# Patient Record
Sex: FEMALE | Race: BLACK OR AFRICAN AMERICAN
[De-identification: names, ages, dates, MRNs, and addresses within clinical notes are randomized per-mention and may not be internally consistent; named-entity substitution may affect disease eponyms.]

---

## 2017-11-29 ENCOUNTER — HOSPITAL ENCOUNTER (EMERGENCY)
Dept: HOSPITAL 8 - ED | Age: 22
Discharge: HOME | End: 2017-11-29
Payer: MEDICAID

## 2017-11-29 VITALS — SYSTOLIC BLOOD PRESSURE: 139 MMHG | DIASTOLIC BLOOD PRESSURE: 74 MMHG

## 2017-11-29 VITALS — BODY MASS INDEX: 36.4 KG/M2 | WEIGHT: 231.93 LBS | HEIGHT: 67 IN

## 2017-11-29 DIAGNOSIS — N30.01: ICD-10-CM

## 2017-11-29 DIAGNOSIS — Z76.0: Primary | ICD-10-CM

## 2017-11-29 DIAGNOSIS — L20.9: ICD-10-CM

## 2017-11-29 LAB
HCG UR LOT: (no result)
HCG UR OBC: (no result)

## 2017-11-29 PROCEDURE — 87186 SC STD MICRODIL/AGAR DIL: CPT

## 2017-11-29 PROCEDURE — 81001 URINALYSIS AUTO W/SCOPE: CPT

## 2017-11-29 PROCEDURE — 87086 URINE CULTURE/COLONY COUNT: CPT

## 2017-11-29 PROCEDURE — 99284 EMERGENCY DEPT VISIT MOD MDM: CPT

## 2017-11-29 PROCEDURE — 87077 CULTURE AEROBIC IDENTIFY: CPT

## 2017-11-29 PROCEDURE — 81025 URINE PREGNANCY TEST: CPT

## 2017-12-29 ENCOUNTER — APPOINTMENT (OUTPATIENT)
Dept: RADIOLOGY | Facility: MEDICAL CENTER | Age: 22
End: 2017-12-29
Attending: EMERGENCY MEDICINE
Payer: COMMERCIAL

## 2017-12-29 ENCOUNTER — HOSPITAL ENCOUNTER (EMERGENCY)
Facility: MEDICAL CENTER | Age: 22
End: 2017-12-29
Attending: EMERGENCY MEDICINE
Payer: COMMERCIAL

## 2017-12-29 VITALS
OXYGEN SATURATION: 100 % | HEART RATE: 75 BPM | BODY MASS INDEX: 37.2 KG/M2 | RESPIRATION RATE: 15 BRPM | DIASTOLIC BLOOD PRESSURE: 83 MMHG | HEIGHT: 67 IN | SYSTOLIC BLOOD PRESSURE: 142 MMHG | TEMPERATURE: 98.6 F | WEIGHT: 237 LBS

## 2017-12-29 DIAGNOSIS — S09.90XA CLOSED HEAD INJURY, INITIAL ENCOUNTER: ICD-10-CM

## 2017-12-29 DIAGNOSIS — S92.901A CLOSED FRACTURE OF RIGHT FOOT, INITIAL ENCOUNTER: ICD-10-CM

## 2017-12-29 DIAGNOSIS — S00.83XA CONTUSION OF FOREHEAD, INITIAL ENCOUNTER: ICD-10-CM

## 2017-12-29 DIAGNOSIS — S90.31XA CONTUSION OF RIGHT FOOT, INITIAL ENCOUNTER: ICD-10-CM

## 2017-12-29 DIAGNOSIS — T14.8XXA ABRASION: ICD-10-CM

## 2017-12-29 PROCEDURE — 305948 HCHG GREEN TRAUMA ACT PRE-NOTIFY NO CC

## 2017-12-29 PROCEDURE — 73630 X-RAY EXAM OF FOOT: CPT | Mod: RT

## 2017-12-29 PROCEDURE — 70450 CT HEAD/BRAIN W/O DYE: CPT

## 2017-12-29 PROCEDURE — 99284 EMERGENCY DEPT VISIT MOD MDM: CPT

## 2017-12-29 RX ORDER — KETOROLAC TROMETHAMINE 10 MG/1
10 TABLET, FILM COATED ORAL EVERY 6 HOURS PRN
Qty: 22 TAB | Refills: 0 | Status: SHIPPED | OUTPATIENT
Start: 2017-12-29 | End: 2019-06-12

## 2017-12-29 ASSESSMENT — PAIN SCALES - WONG BAKER: WONGBAKER_NUMERICALRESPONSE: HURTS A LITTLE MORE
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
## 2017-12-29 NOTE — DISCHARGE INSTRUCTIONS
Contusion  A contusion is a deep bruise. Contusions are the result of an injury that caused bleeding under the skin. The contusion may turn blue, purple, or yellow. Minor injuries will give you a painless contusion, but more severe contusions may stay painful and swollen for a few weeks.   CAUSES   A contusion is usually caused by a blow, trauma, or direct force to an area of the body.  SYMPTOMS   · Swelling and redness of the injured area.  · Bruising of the injured area.  · Tenderness and soreness of the injured area.  · Pain.  DIAGNOSIS   The diagnosis can be made by taking a history and physical exam. An X-ray, CT scan, or MRI may be needed to determine if there were any associated injuries, such as fractures.  TREATMENT   Specific treatment will depend on what area of the body was injured. In general, the best treatment for a contusion is resting, icing, elevating, and applying cold compresses to the injured area. Over-the-counter medicines may also be recommended for pain control. Ask your caregiver what the best treatment is for your contusion.  HOME CARE INSTRUCTIONS   · Put ice on the injured area.  ¨ Put ice in a plastic bag.  ¨ Place a towel between your skin and the bag.  ¨ Leave the ice on for 15-20 minutes, 3-4 times a day, or as directed by your health care provider.  · Only take over-the-counter or prescription medicines for pain, discomfort, or fever as directed by your caregiver. Your caregiver may recommend avoiding anti-inflammatory medicines (aspirin, ibuprofen, and naproxen) for 48 hours because these medicines may increase bruising.  · Rest the injured area.  · If possible, elevate the injured area to reduce swelling.  SEEK IMMEDIATE MEDICAL CARE IF:   · You have increased bruising or swelling.  · You have pain that is getting worse.  · Your swelling or pain is not relieved with medicines.  MAKE SURE YOU:   · Understand these instructions.  · Will watch your condition.  · Will get help right  away if you are not doing well or get worse.     This information is not intended to replace advice given to you by your health care provider. Make sure you discuss any questions you have with your health care provider.     Document Released: 09/27/2006 Document Revised: 12/23/2014 Document Reviewed: 10/22/2012  [employer] Interactive Patient Education ©2016 [employer]      Head Injury, Adult  You have a head injury. Headaches and throwing up (vomiting) are common after a head injury. It should be easy to wake up from sleeping. Sometimes you must stay in the hospital. Most problems happen within the first 24 hours. Side effects may occur up to 7-10 days after the injury.   WHAT ARE THE TYPES OF HEAD INJURIES?  Head injuries can be as minor as a bump. Some head injuries can be more severe. More severe head injuries include:  · A jarring injury to the brain (concussion).  · A bruise of the brain (contusion). This mean there is bleeding in the brain that can cause swelling.  · A cracked skull (skull fracture).  · Bleeding in the brain that collects, clots, and forms a bump (hematoma).  WHEN SHOULD I GET HELP RIGHT AWAY?   · You are confused or sleepy.  · You cannot be woken up.  · You feel sick to your stomach (nauseous) or keep throwing up (vomiting).  · Your dizziness or unsteadiness is getting worse.  · You have very bad, lasting headaches that are not helped by medicine. Take medicines only as told by your doctor.  · You cannot use your arms or legs like normal.  · You cannot walk.  · You notice changes in the black spots in the center of the colored part of your eye (pupil).  · You have clear or bloody fluid coming from your nose or ears.  · You have trouble seeing.  During the next 24 hours after the injury, you must stay with someone who can watch you. This person should get help right away (call 911 in the [place] if you start to shake and are not able to control it (have seizures), you pass out, or you are  unable to wake up.  HOW CAN I PREVENT A HEAD INJURY IN THE FUTURE?  · Wear seat belts.  · Wear a helmet while bike riding and playing sports like football.  · Stay away from dangerous activities around the house.  WHEN CAN I RETURN TO NORMAL ACTIVITIES AND ATHLETICS?  See your doctor before doing these activities. You should not do normal activities or play contact sports until 1 week after the following symptoms have stopped:  · Headache that does not go away.  · Dizziness.  · Poor attention.  · Confusion.  · Memory problems.  · Sickness to your stomach or throwing up.  · Tiredness.  · Fussiness.  · Bothered by bright lights or loud noises.  · Anxiousness or depression.  · Restless sleep.  MAKE SURE YOU:   · Understand these instructions.  · Will watch your condition.  · Will get help right away if you are not doing well or get worse.     This information is not intended to replace advice given to you by your health care provider. Make sure you discuss any questions you have with your health care provider.     Document Released: 11/30/2009 Document Revised: 01/08/2016 Document Reviewed: 08/25/2014  [employer] Interactive Patient Education ©2016 [employer]  Return if fever, vomiting or if no better in 12 hours.
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
## 2018-03-23 ENCOUNTER — APPOINTMENT (OUTPATIENT)
Dept: RADIOLOGY | Facility: MEDICAL CENTER | Age: 23
End: 2018-03-23
Attending: EMERGENCY MEDICINE
Payer: COMMERCIAL

## 2018-03-23 ENCOUNTER — HOSPITAL ENCOUNTER (EMERGENCY)
Facility: MEDICAL CENTER | Age: 23
End: 2018-03-23
Attending: EMERGENCY MEDICINE
Payer: COMMERCIAL

## 2018-03-23 VITALS
WEIGHT: 199.74 LBS | HEIGHT: 67 IN | HEART RATE: 97 BPM | TEMPERATURE: 97.8 F | BODY MASS INDEX: 31.35 KG/M2 | OXYGEN SATURATION: 97 % | DIASTOLIC BLOOD PRESSURE: 87 MMHG | RESPIRATION RATE: 16 BRPM | SYSTOLIC BLOOD PRESSURE: 117 MMHG

## 2018-03-23 DIAGNOSIS — M43.6 TORTICOLLIS: ICD-10-CM

## 2018-03-23 DIAGNOSIS — Z20.2 STD EXPOSURE: ICD-10-CM

## 2018-03-23 DIAGNOSIS — M79.671 RIGHT FOOT PAIN: ICD-10-CM

## 2018-03-23 LAB
APPEARANCE UR: CLEAR
BACTERIA GENITAL QL WET PREP: NORMAL
C TRACH DNA SPEC QL NAA+PROBE: POSITIVE
COLOR UR AUTO: YELLOW
GLUCOSE UR QL STRIP.AUTO: NEGATIVE MG/DL
HCG UR QL: NEGATIVE
KETONES UR QL STRIP.AUTO: ABNORMAL MG/DL
LEUKOCYTE ESTERASE UR QL STRIP.AUTO: NEGATIVE
N GONORRHOEA DNA SPEC QL NAA+PROBE: NEGATIVE
NITRITE UR QL STRIP.AUTO: NEGATIVE
PH UR STRIP.AUTO: 6 [PH]
PROT UR QL STRIP: NEGATIVE MG/DL
RBC UR QL AUTO: NEGATIVE
SIGNIFICANT IND 70042: NORMAL
SITE SITE: NORMAL
SOURCE SOURCE: NORMAL
SP GR UR: >=1.03
SPECIMEN SOURCE: ABNORMAL

## 2018-03-23 PROCEDURE — 99284 EMERGENCY DEPT VISIT MOD MDM: CPT

## 2018-03-23 PROCEDURE — 96372 THER/PROPH/DIAG INJ SC/IM: CPT

## 2018-03-23 PROCEDURE — 73630 X-RAY EXAM OF FOOT: CPT | Mod: RT

## 2018-03-23 PROCEDURE — A9270 NON-COVERED ITEM OR SERVICE: HCPCS | Performed by: EMERGENCY MEDICINE

## 2018-03-23 PROCEDURE — 81025 URINE PREGNANCY TEST: CPT

## 2018-03-23 PROCEDURE — 81002 URINALYSIS NONAUTO W/O SCOPE: CPT

## 2018-03-23 PROCEDURE — 700111 HCHG RX REV CODE 636 W/ 250 OVERRIDE (IP): Performed by: EMERGENCY MEDICINE

## 2018-03-23 PROCEDURE — 700102 HCHG RX REV CODE 250 W/ 637 OVERRIDE(OP): Performed by: EMERGENCY MEDICINE

## 2018-03-23 PROCEDURE — 87591 N.GONORRHOEAE DNA AMP PROB: CPT

## 2018-03-23 PROCEDURE — 87491 CHLMYD TRACH DNA AMP PROBE: CPT

## 2018-03-23 RX ORDER — CEFTRIAXONE SODIUM 250 MG/1
250 INJECTION, POWDER, FOR SOLUTION INTRAMUSCULAR; INTRAVENOUS ONCE
Status: COMPLETED | OUTPATIENT
Start: 2018-03-23 | End: 2018-03-23

## 2018-03-23 RX ORDER — AZITHROMYCIN 250 MG/1
1000 TABLET, FILM COATED ORAL ONCE
Status: COMPLETED | OUTPATIENT
Start: 2018-03-23 | End: 2018-03-23

## 2018-03-23 RX ADMIN — AZITHROMYCIN 1000 MG: 250 TABLET, FILM COATED ORAL at 09:38

## 2018-03-23 RX ADMIN — CEFTRIAXONE SODIUM 250 MG: 250 INJECTION, POWDER, FOR SOLUTION INTRAMUSCULAR; INTRAVENOUS at 09:38

## 2018-03-23 ASSESSMENT — ENCOUNTER SYMPTOMS
NECK PAIN: 1
HEADACHES: 0
BACK PAIN: 1

## 2018-03-23 ASSESSMENT — PAIN SCALES - GENERAL: PAINLEVEL_OUTOF10: 8

## 2018-03-23 NOTE — DISCHARGE INSTRUCTIONS
Please follow-up with your primary care provider for blood pressure management.   Acute Torticollis  Torticollis is a condition in which the muscles of the neck tighten (contract) abnormally, causing the neck to twist and the head to move into an unnatural position. Torticollis that develops suddenly is called acute torticollis. If torticollis becomes chronic and is left untreated, the face and neck can become deformed.  CAUSES  This condition may be caused by:  · Sleeping in an awkward position (common).  · Extending or twisting the neck muscles beyond their normal position.  · Infection.  In some cases, the cause may not be known.  SYMPTOMS  Symptoms of this condition include:  · An unnatural position of the head.  · Neck pain.  · A limited ability to move the neck.  · Twisting of the neck to one side.  DIAGNOSIS  This condition is diagnosed with a physical exam. You may also have imaging tests, such as an X-ray, CT scan, or MRI.  TREATMENT  Treatment for this condition involves trying to relax the neck muscles. It may include:  · Medicines or shots.  · Physical therapy.  · Surgery. This may be done in severe cases.  HOME CARE INSTRUCTIONS  · Take medicines only as directed by your health care provider.  · Do stretching exercises and massage your neck as directed by your health care provider.  · Keep all follow-up visits as directed by your health care provider. This is important.  SEEK MEDICAL CARE IF:  · You develop a fever.  SEEK IMMEDIATE MEDICAL CARE IF:  · You develop difficulty breathing.  · You develop noisy breathing (stridor).  · You start drooling.  · You have trouble swallowing or have pain with swallowing.  · You develop numbness or weakness in your hands or feet.  · You have changes in your speech, understanding, or vision.  · Your pain gets worse.  This information is not intended to replace advice given to you by your health care provider. Make sure you discuss any questions you have with your  health care provider.  Document Released: 12/15/2001 Document Revised: 04/10/2017 Document Reviewed: 12/14/2015  [employer] Interactive Patient Education © 2017 [employer]  Foot Fracture  Your caregiver has diagnosed you as having a healing foot fracture (broken bone). Your foot has many bones. You have an old fracture, or break, in one of these bones. In some cases, your doctor may put on a splint or removable fracture boot until the swelling in your foot has lessened. A cast may or may not be required.  HOME CARE INSTRUCTIONS   If you do not have a cast or splint:  · You may bear weight on your injured foot as tolerated or advised.   · Do not put any weight on your injured foot for as long as directed by your caregiver. Slowly increase the amount of time you walk on the foot as the pain and swelling allows or as advised.   · Use crutches until you can bear weight without pain. A gradual increase in weight bearing may help.   · Apply ice to the injury for 15-20 minutes each hour while awake for the first 2 days. Put the ice in a plastic bag and place a towel between the bag of ice and your skin.   · If an ace bandage (stretchy, elastic wrapping bandage) was applied, you may re-wrap it if ankle is more painful or your toes become cold and swollen.   If you have a cast or splint:  · Use your crutches for as long as directed by your caregiver.   · To lessen the swelling, keep the injured foot elevated on pillows while lying down or sitting. Elevate your foot above your heart.   · Apply ice to the injury for 15-20 minutes each hour while awake for the first 2 days. Put the ice in a plastic bag and place a thin towel between the bag of ice and your cast.   · Plaster or fiberglass cast:   · Do not try to scratch the skin under the cast using a sharp or pointed object down the cast.   · Check the skin around the cast every day. You may put lotion on any red or sore areas.   · Keep your cast clean and dry.   · Plaster  splint:   · Wear the splint until you are seen for a follow-up examination.   · You may loosen the elastic around the splint if your toes become numb, tingle, or turn blue or cold. Do not rest it on anything harder than a pillow in the first 24 hours.   · Do not put pressure on any part of your splint. Use your crutches as directed.   · Keep your splint dry. It can be protected during bathing with a plastic bag. Do not lower the splint into water.   · If you have a fracture boot you may remove it to shower. Bear weight only as instructed by your caregiver.   · Only take over-the-counter or prescription medicines for pain, discomfort, or fever as directed by your caregiver.   SEEK IMMEDIATE MEDICAL CARE IF:   · Your cast gets damaged or breaks.   · You have continued severe pain or more swelling than you did before the cast was put on.   · Your skin or nails of your casted foot turn blue, gray, feel cold or numb.   · There is a bad smell from your cast.   · There is severe pain with movement of your toes.   · There are new stains and/or drainage coming from under the cast.   MAKE SURE YOU:   · Understand these instructions.   · Will watch your condition.   · Will get help right away if you are not doing well or get worse.   Document Released: 12/15/2001 Document Revised: 03/11/2013 Document Reviewed: 01/21/2010  [employer]® Patient Information ©2013 [employer] [place].
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
## 2018-03-23 NOTE — ED PROVIDER NOTES
"ED Provider Note    Scribed for Prasad Espinosa M.D. by Erickson Guevara. 3/23/2018, 8:46 AM.    Primary care provider: Pcp Pt States None  Means of arrival: Walk-in  History obtained from: Patient  History limited by: None    CHIEF COMPLAINT  Chief Complaint   Patient presents with   • Foot Pain   • Neck Pain   • Vaginal Discharge       HPI  Angelo Marinelli is a 22 y.o. female who presents to the Emergency Department for multiple complaints of right foot pain, right-sided neck pain, and vaginal evaluation. The patient reports of initially developing right foot pain in December 2017 and was seen in this ED for symptoms. She was given a walking boot after being diagnosed with closed fracture and was advised to follow up with Ortho. However, she reported significant discomfort with the walking boot and took it off without being seen by Ortho. The patient reports of worsening right foot pain several days ago with discomfort in all different toes at different times, but is still able to ambulate without complications.     The patient complaints of right-sided neck pain after sleeping on it wrong and woke up with the development of a \"knot to the area of the neck\". She has been massaging the area and states the knot has minimally decreased. She states the neck pain has significantly worsened today with the development of sharp pains to the upper mid back. She denies any headaches.     The patient is requesting to have a vaginal evaluation and to be tested for STDs after her significant other began developing urinary discomfort yesterday. She denies any vaginal pain or discomfort, fever, or chills. Both her and her partner deny any new sexual encounters. Her partner is also seen in the ED to be evaluated.     REVIEW OF SYSTEMS  Review of Systems   Genitourinary: Negative.    Musculoskeletal: Positive for back pain, joint pain (Right foot) and neck pain (Right).   Neurological: Negative for headaches.   E.     PAST " "MEDICAL HISTORY  The patient has no chronic medical history.    SURGICAL HISTORY  patient denies any surgical history    SOCIAL HISTORY  Social History   Substance Use Topics   • Smoking status: Never Smoker   • Alcohol use No      History   Drug Use   • Types: Inhaled     Comment: Marijuana     FAMILY HISTORY  No family history noted    CURRENT MEDICATIONS  No current facility-administered medications for this encounter.     Current Outpatient Prescriptions:   •  ketorolac (TORADOL) 10 MG Tab, Take 1 Tab by mouth every 6 hours as needed for Mild Pain for up to 22 doses., Disp: 22 Tab, Rfl: 0    ALLERGIES  Allergies   Allergen Reactions   • Pcn [Penicillins]        PHYSICAL EXAM  VITAL SIGNS: /65   Pulse (!) 103   Temp 36.3 °C (97.4 °F) (Temporal)   Resp 14   Ht 1.702 m (5' 7\")   Wt 90.6 kg (199 lb 11.8 oz)   SpO2 99%   BMI 31.28 kg/m²     Constitutional: Well developed, Well nourished, No acute distress, Non-toxic appearance.   HENT: Normocephalic, Atraumatic, Bilateral external ears normal, oropharynx moist, No oral exudates, Nose normal.   Eyes:conjunctiva is normal, there are no signs of exudate.   Neck: Supple, no meningeal signs. Tenderness to the right paraspinous musculature.   Lymphatic: One single lymph node that is slightly enlarged at 1cm, tender to palpation without any erythema. No other lymphadenopathy noted.   Cardiovascular: Tachycardic, Regular rhythm without murmurs gallops or rubs.   Thorax & Lungs: No respiratory distress. Breathing comfortably. Lungs are clear to auscultation bilaterally, there are no wheezes no rales. Chest wall is nontender.  Abdomen: Soft, nontender, nondistended. Bowel sounds are present.   Skin: Warm, Dry, No erythema.  Pelvic Exam: Accompanied by female nurse, normal external female genitalia, no discharge noted.   Back: No tenderness, No CVA tenderness.  Musculoskeletal: Tenderness to the lateral aspect of the right foot without any edema or swelling. Good " range of motion in all other major joints. Intact distal pulses, no clubbing, no cyanosis. Patient is able to family without assistance  Neurologic: Alert & oriented x 3, Moving all extremities. Ambulatory without assistance, neurologically intact.   Psychiatric: Affect normal, Judgment normal, Mood normal.     LABS  Results for orders placed or performed during the hospital encounter of 03/23/18   WET PREP   Result Value Ref Range    Significant Indicator NEG     Source GEN     Site VAGINAL     Wet Prep For Parasites       Many WBC's seen.  No motile Trichomonas seen.  No clue cells seen.  No yeast.     CHLAMYDIA & GC BY PCR   Result Value Ref Range    Source Genital    POC UA   Result Value Ref Range    POC Color Yellow     POC Appearance Clear     POC Glucose Negative Negative mg/dL    POC Ketones Trace (A) Negative mg/dL    POC Specific Gravity >=1.030 (A) 1.005 - 1.030    POC Blood Negative Negative    POC Urine PH 6.0 5.0 - 8.0    POC Protein Negative Negative mg/dL    POC Nitrites Negative Negative    POC Leukocyte Esterase Negative Negative   POC URINE PREGNANCY   Result Value Ref Range    POC Urine Pregnancy Test Negative Negative   All labs reviewed by me.    RADIOLOGY  DX-FOOT-COMPLETE 3+ RIGHT   Final Result      Healing fracture distal right fifth metatarsal. Residual fracture line identified.   No new fracture.   Distal fourth metatarsal fracture callus formation since previous examination consistent with stress fracture.      The radiologist's interpretation of all radiological studies have been reviewed by me.    COURSE & MEDICAL DECISION MAKING  Pertinent Labs & Imaging studies reviewed. (See chart for details)    8:55 AM - Patient seen and examined at bedside. Informed the patient that her neck pain is likely due to torticollis, in which I had recommended movement and stretches of the neck for relief and using warmth for muscle tension relief. An xray will be ordered to evaluate her right foot, but  have recommended she follow up with Ortho for orthotics. Additionally, a pelvic exam will be performed to rule out chlamydia/GC, and she will be treated with Rocephin 250mg and Zithromax 1g for possible exposure to STD's. Wet prep, POC urinalysis, and POC urine pregnancy ordered. Patient understands and is agreeable to plan of care.     9:09 AM Pelvic Exam was performed at this time with assisted female nurse, see above for more details.     9:33 AM Patient was reevaluated at bedside. She is standing up in her room with her significant other. Discussed radiology results with the patient and informed them that there is no new fracture on xray. I have recommended she follow up with Ortho. The patient will be discharged and should return if symptoms worsen or if new symptoms arise. The patient understands and agrees to plan.       Decision Making:  Patient presents with multiple complaints. The neck is torticollis. Recommend range of motion exercises and anti-inflammatories. From standpoint of the foot. She does have healing fractures. I recommended following up with an orthopedist for orthotics placement. At this point. It's been 3 months. Recommend anti-inflammatory as needed. Follow-up as above. From the standpoint of potential STD was treated empirically. There is no other significant finding and clinical evaluation. At this point. Recommend follow-up with a primary care physician for further outpatient treatment and care.    The patient will return for new or worsening symptoms and is stable at the time of discharge.    It was noticed that the patient's blood pressure was greater than 120/80 on today's visit. At this point, most likely related to reactive hypertension, secondary to the emergency visit itself. I have recommend the patient followup with her primary care physician for recheck of her blood pressure.        DISPOSITION:  Patient will be discharged home in stable condition.    FOLLOW UP:  [facility]  WIL[facility] Abrams  [address]  [address]  [place] [address]  [phone]    Schedule an appointment as soon as possible for a visit        FINAL IMPRESSION  1. Torticollis    2. Right foot pain    3. STD exposure          IErickson (Scribe), am scribing for, and in the presence of, Prasad Espinosa M.D..    Electronically signed by: Erickson Guevara ([email or web address]), 3/23/2018    IPrasad M.D. personally performed the services described in this documentation, as scribed by Erickson Guevara in my presence, and it is both accurate and complete.    The note accurately reflects work and decisions made by me.  Prasad Espinosa  3/23/2018  12:05 PM

## 2018-03-23 NOTE — ED NOTES
Pt given d/c instructions, f/u info,  VSS at discharge. Pt ambulatory from the ED w/ steady gait.  All belongings in possession on discharge.  Pt escorted to the lobby.

## 2018-03-23 NOTE — ED NOTES
Pt ambulatory to [facility].  Pt previously became upset in the hallway because she was not given the same room as significant other.  Pt's roomed separately but [id number] due to need for pelvic gurney and room for evaluations.  Pt provided a gown to change into.

## 2018-03-26 NOTE — ED NOTES
"ED Positive Culture Follow-up/Notification Note:    Date: 3/26/18     Patient seen in the ED on 3/23/2018 for:   1. Torticollis    2. Right foot pain    3. STD exposure       She was treated with ceftriaxone 250 mg IM x 1 and azithromycin 1 gm PO x 1 in the ED.     Discharge Medication List as of 3/23/2018  9:44 AM          Allergies: Pcn [penicillins]     Vitals:    03/23/18 0825 03/23/18 0829 03/23/18 0950   BP: 123/65  117/87   Pulse: (!) 103  97   Resp: 14  16   Temp: 36.3 °C (97.4 °F)  36.6 °C (97.8 °F)   TempSrc: Temporal     SpO2: 99%  97%   Weight:  90.6 kg (199 lb 11.8 oz)    Height: 1.702 m (5' 7\")         Final cultures:   Results     Procedure Component Value Units Date/Time    CHLAMYDIA & GC BY PCR [[id number]]  (Abnormal) Collected:  03/23/18 0910    Order Status:  Completed Specimen:  Genital from Genital Updated:  03/23/18 1904     Source Genital     C. trachomatis by PCR POSITIVE (A)     N. gonorrhoeae by PCR Negative    WET PREP [[id number]] Collected:  03/23/18 0910    Order Status:  Completed Specimen:  Genital from Vaginal Updated:  03/23/18 0928     Significant Indicator NEG     Source GEN     Site VAGINAL     Wet Prep For Parasites Many WBC's seen.  No motile Trichomonas seen.  No clue cells seen.  No yeast.            Plan:   - Patient was treated for chlamydia while in the ED.  - Informed and counseled patient on the positive chlamydia result.    Jenny Thomson    "

## 2018-07-18 ENCOUNTER — HOSPITAL ENCOUNTER (EMERGENCY)
Dept: HOSPITAL 8 - ED | Age: 23
End: 2018-07-18
Payer: MEDICAID

## 2018-07-18 VITALS — BODY MASS INDEX: 30.1 KG/M2 | WEIGHT: 191.8 LBS | HEIGHT: 67 IN

## 2018-07-18 VITALS — DIASTOLIC BLOOD PRESSURE: 70 MMHG | SYSTOLIC BLOOD PRESSURE: 120 MMHG

## 2018-07-18 DIAGNOSIS — R19.7: ICD-10-CM

## 2018-07-18 DIAGNOSIS — N89.8: Primary | ICD-10-CM

## 2018-07-18 DIAGNOSIS — F90.9: ICD-10-CM

## 2018-07-18 DIAGNOSIS — F31.9: ICD-10-CM

## 2018-07-18 DIAGNOSIS — R10.2: ICD-10-CM

## 2018-07-18 DIAGNOSIS — R05: ICD-10-CM

## 2018-07-18 DIAGNOSIS — F12.10: ICD-10-CM

## 2018-07-18 LAB
CLUE CELLS: (no result)
CULTURE INDICATED?: YES
HCG UR SG: 1.02 (ref 1–1.03)
MICROSCOPIC: (no result)
T VAGINALIS RRNA GENITAL QL PROBE: (no result)
WET PREP WBCS: (no result)

## 2018-07-18 PROCEDURE — 99284 EMERGENCY DEPT VISIT MOD MDM: CPT

## 2018-07-18 PROCEDURE — 81001 URINALYSIS AUTO W/SCOPE: CPT

## 2018-07-18 PROCEDURE — 96372 THER/PROPH/DIAG INJ SC/IM: CPT

## 2018-07-18 PROCEDURE — 87086 URINE CULTURE/COLONY COUNT: CPT

## 2018-07-18 PROCEDURE — 87808 TRICHOMONAS ASSAY W/OPTIC: CPT

## 2018-07-18 PROCEDURE — 81025 URINE PREGNANCY TEST: CPT

## 2018-07-18 PROCEDURE — 87491 CHLMYD TRACH DNA AMP PROBE: CPT

## 2018-07-18 PROCEDURE — 87591 N.GONORRHOEAE DNA AMP PROB: CPT

## 2018-07-18 PROCEDURE — 87210 SMEAR WET MOUNT SALINE/INK: CPT

## 2019-06-12 ENCOUNTER — HOSPITAL ENCOUNTER (EMERGENCY)
Facility: MEDICAL CENTER | Age: 24
End: 2019-06-12
Attending: EMERGENCY MEDICINE
Payer: MEDICAID

## 2019-06-12 ENCOUNTER — APPOINTMENT (OUTPATIENT)
Dept: RADIOLOGY | Facility: MEDICAL CENTER | Age: 24
End: 2019-06-12
Attending: EMERGENCY MEDICINE
Payer: MEDICAID

## 2019-06-12 VITALS
WEIGHT: 197.09 LBS | OXYGEN SATURATION: 96 % | HEART RATE: 70 BPM | SYSTOLIC BLOOD PRESSURE: 135 MMHG | DIASTOLIC BLOOD PRESSURE: 88 MMHG | RESPIRATION RATE: 15 BRPM | HEIGHT: 66 IN | BODY MASS INDEX: 31.68 KG/M2 | TEMPERATURE: 97.6 F

## 2019-06-12 DIAGNOSIS — N93.9 VAGINAL BLEEDING: ICD-10-CM

## 2019-06-12 DIAGNOSIS — E86.0 DEHYDRATION: ICD-10-CM

## 2019-06-12 LAB
ANION GAP SERPL CALC-SCNC: 8 MMOL/L (ref 0–11.9)
APPEARANCE UR: CLEAR
B-HCG SERPL-ACNC: <0.6 MIU/ML (ref 0–5)
BASOPHILS # BLD AUTO: 0.2 % (ref 0–1.8)
BASOPHILS # BLD: 0.02 K/UL (ref 0–0.12)
BILIRUB UR QL STRIP.AUTO: NEGATIVE
BUN SERPL-MCNC: 16 MG/DL (ref 8–22)
C TRACH DNA SPEC QL NAA+PROBE: NEGATIVE
CALCIUM SERPL-MCNC: 9.1 MG/DL (ref 8.5–10.5)
CHLORIDE SERPL-SCNC: 106 MMOL/L (ref 96–112)
CO2 SERPL-SCNC: 23 MMOL/L (ref 20–33)
COLOR UR: YELLOW
CREAT SERPL-MCNC: 1.02 MG/DL (ref 0.5–1.4)
EOSINOPHIL # BLD AUTO: 0.18 K/UL (ref 0–0.51)
EOSINOPHIL NFR BLD: 2.2 % (ref 0–6.9)
ERYTHROCYTE [DISTWIDTH] IN BLOOD BY AUTOMATED COUNT: 41.5 FL (ref 35.9–50)
GLUCOSE SERPL-MCNC: 126 MG/DL (ref 65–99)
GLUCOSE UR STRIP.AUTO-MCNC: NEGATIVE MG/DL
HCT VFR BLD AUTO: 37.9 % (ref 37–47)
HGB BLD-MCNC: 12.7 G/DL (ref 12–16)
IMM GRANULOCYTES # BLD AUTO: 0.01 K/UL (ref 0–0.11)
IMM GRANULOCYTES NFR BLD AUTO: 0.1 % (ref 0–0.9)
KETONES UR STRIP.AUTO-MCNC: ABNORMAL MG/DL
LEUKOCYTE ESTERASE UR QL STRIP.AUTO: NEGATIVE
LYMPHOCYTES # BLD AUTO: 2.88 K/UL (ref 1–4.8)
LYMPHOCYTES NFR BLD: 35.8 % (ref 22–41)
MCH RBC QN AUTO: 30.1 PG (ref 27–33)
MCHC RBC AUTO-ENTMCNC: 33.5 G/DL (ref 33.6–35)
MCV RBC AUTO: 89.8 FL (ref 81.4–97.8)
MICRO URNS: ABNORMAL
MONOCYTES # BLD AUTO: 0.67 K/UL (ref 0–0.85)
MONOCYTES NFR BLD AUTO: 8.3 % (ref 0–13.4)
N GONORRHOEA DNA SPEC QL NAA+PROBE: NEGATIVE
NEUTROPHILS # BLD AUTO: 4.29 K/UL (ref 2–7.15)
NEUTROPHILS NFR BLD: 53.4 % (ref 44–72)
NITRITE UR QL STRIP.AUTO: NEGATIVE
NRBC # BLD AUTO: 0 K/UL
NRBC BLD-RTO: 0 /100 WBC
PH UR STRIP.AUTO: 7 [PH]
PLATELET # BLD AUTO: 240 K/UL (ref 164–446)
PMV BLD AUTO: 10.5 FL (ref 9–12.9)
POTASSIUM SERPL-SCNC: 3.8 MMOL/L (ref 3.6–5.5)
PROT UR QL STRIP: NEGATIVE MG/DL
RBC # BLD AUTO: 4.22 M/UL (ref 4.2–5.4)
RBC UR QL AUTO: NEGATIVE
SODIUM SERPL-SCNC: 137 MMOL/L (ref 135–145)
SP GR UR STRIP.AUTO: 1.01
SPECIMEN SOURCE: NORMAL
UROBILINOGEN UR STRIP.AUTO-MCNC: 0.2 MG/DL
WBC # BLD AUTO: 8.1 K/UL (ref 4.8–10.8)

## 2019-06-12 PROCEDURE — 84702 CHORIONIC GONADOTROPIN TEST: CPT

## 2019-06-12 PROCEDURE — 85025 COMPLETE CBC W/AUTO DIFF WBC: CPT

## 2019-06-12 PROCEDURE — 87491 CHLMYD TRACH DNA AMP PROBE: CPT

## 2019-06-12 PROCEDURE — 87591 N.GONORRHOEAE DNA AMP PROB: CPT

## 2019-06-12 PROCEDURE — 81003 URINALYSIS AUTO W/O SCOPE: CPT

## 2019-06-12 PROCEDURE — 76856 US EXAM PELVIC COMPLETE: CPT

## 2019-06-12 PROCEDURE — 80048 BASIC METABOLIC PNL TOTAL CA: CPT

## 2019-06-12 PROCEDURE — 99284 EMERGENCY DEPT VISIT MOD MDM: CPT

## 2019-06-12 ASSESSMENT — ENCOUNTER SYMPTOMS
FLANK PAIN: 0
BACK PAIN: 0
SHORTNESS OF BREATH: 0
CHILLS: 0
FEVER: 0
HEADACHES: 0
VOMITING: 0
NAUSEA: 0

## 2019-06-12 NOTE — DISCHARGE INSTRUCTIONS
Follow-up with your doctor in the next 2 to 3 days.  If symptoms persist, can follow-up with GYN, Dr. Gabriel who is on-call for the emergency department today.  Please be sure you drink plenty of fluids particularly in the hot weather.

## 2019-06-12 NOTE — ED TRIAGE NOTES
"NaborSoraida Marinelli  23 y.o. female  Chief Complaint   Patient presents with   • Vaginal Bleeding     Since Sunday. Pt has soaked 3 pads in the past 24 hours.    • Abdominal Pain     Lower abdominal pain since Saturday, rated at 6/10, described as stabbing   • Weakness       Pt ambulated to [facility] with steady gait for above complaint.     Pt is alert and oriented, speaking in full sentences, follows commands and responds appropriately to questions. NAD. Resp are even and unlabored.     Pt returned to lobby, educated on triage process, and to inform staff of any changes or concerns.    Hx: recent pregnancy     Blood Pressure: 135/88, Pulse: (!) 112, Respiration: 18, Temperature: 36.4 °C (97.6 °F), Height: 167.6 cm (5' 6\"), Weight: 89.4 kg (197 lb 1.5 oz), Pulse Oximetry: 99 %, O2 Delivery: None (Room Air)    "

## 2019-06-12 NOTE — ED PROVIDER NOTES
"ED Provider Note    ED Provider Note    Primary care provider: Pcp Pt States None  Means of arrival: POV  History obtained from: Patient  History limited by: None    CHIEF COMPLAINT  Chief Complaint   Patient presents with   • Vaginal Bleeding     Since . Pt has soaked 3 pads in the past 24 hours.    • Abdominal Pain     Lower abdominal pain since Saturday, rated at 6/10, described as stabbing   • Weakness       HPI  Angelo Marinelli is a 23 y.o. female who presents to the Emergency Department with a chief complaint of vaginal bleeding.  Patient status post a  on .  She had a what she considers her normal period 2 weeks ago.  Now she is bleeding again.  She states it started with spotting Saturday and pelvic pain that she describes as sharp.  This then increased on  and Monday, prompting her now ED visit.  2 days ago, she states she started having some diarrhea and lightheadedness.  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  She returned 9 days ago and she states it is very labor-intensive.  She is .  She states she is not on birth control pills and has been having unprotected intercourse and is concerned that she is likely pregnant as she feels that her breasts are tender and she is been right in the past about this.  She otherwise denies any past medical history.  No fever area patient is highly concerned, that she has retained products of conception, retained placenta because she posted this on her \"mommy board\" and this is the information that she got.  No chest pain or shortness of breath.    REVIEW OF SYSTEMS  Review of Systems   Constitutional: Negative for chills and fever.   HENT: Negative for congestion.    Respiratory: Negative for shortness of breath.    Cardiovascular: Negative for chest pain.   Gastrointestinal: Negative for nausea and vomiting.   Genitourinary: Negative for dysuria, flank pain and urgency.   Musculoskeletal: Negative " "for back pain.   Neurological: Negative for headaches.   All other systems reviewed and are negative.      PAST MEDICAL HISTORY   Patient denies any past medical history.    SURGICAL HISTORY      SOCIAL HISTORY  Social History   Substance Use Topics   • Smoking status: Never Smoker   • Smokeless tobacco: Never Used   • Alcohol use Yes      Comment: rare      History   Drug Use   • Types: Inhaled     Comment: Marijuana       FAMILY HISTORY  History reviewed. No pertinent family history.    CURRENT MEDICATIONS  Home Medications     Reviewed by Michele Carter R.N. (Registered Nurse) on 19 at 0314  Med List Status: Partial   Medication Last Dose Status        Patient Orlin Taking any Medications                       ALLERGIES  Allergies   Allergen Reactions   • Kiwi Extract Itching     Mouth itching   • Pcn [Penicillins]    • Peanut-Derived      swelling   • Pineapple      swelling   • Shellfish-Derived Products    • Tape Rash     BAND-AID   • [place]      + skin test   • Watermelon Flavor      Plus Cantaloupe and honey dew caused swelling       PHYSICAL EXAM  VITAL SIGNS: /88   Pulse 70   Temp 36.4 °C (97.6 °F) (Temporal)   Resp 15   Ht 1.676 m (5' 6\")   Wt 89.4 kg (197 lb 1.5 oz)   SpO2 96%   BMI 31.81 kg/m²   Vitals reviewed.  Constitutional: Patient is oriented to person, place, and time. Appears well-developed and well-nourished. No distress.  She is up, washing her hands in the sink, walking around the exam room as I enter.  Head: Normocephalic and atraumatic.   Mouth/Throat: Oropharynx is clear and moist  Eyes: Conjunctivae are normal.   Neck: Normal range of motion.   Cardiovascular: Normal rate, regular rhythm and normal heart sounds. Normal peripheral pulses.  Pulmonary/Chest: Effort normal and breath sounds normal. No respiratory distress, no wheezes, rhonchi, or rales. No chest wall tenderness.  Abdominal: Soft. Bowel sounds are normal. There is mild, mid lower abdominal " tenderness. No rebound or guarding, or peritoneal signs. No CVA tenderness.  Pelvic: There is a small amount of blood in the vaginal vault.  No clots.  No abnormal discharge.  No masses or lesions.  No CMT tenderness.  Cervical loss is closed.  Musculoskeletal: No edema and no tenderness.   Neurological: No focal deficits.   Skin: Skin is warm and dry. No erythema. No pallor.   Psychiatric: Patient has a normal mood and affect.     LABS  Results for orders placed or performed during the hospital encounter of 06/12/19   CBC WITH DIFFERENTIAL   Result Value Ref Range    WBC 8.1 4.8 - 10.8 K/uL    RBC 4.22 4.20 - 5.40 M/uL    Hemoglobin 12.7 12.0 - 16.0 g/dL    Hematocrit 37.9 37.0 - 47.0 %    MCV 89.8 81.4 - 97.8 fL    MCH 30.1 27.0 - 33.0 pg    MCHC 33.5 (L) 33.6 - 35.0 g/dL    RDW 41.5 35.9 - 50.0 fL    Platelet Count 240 164 - 446 K/uL    MPV 10.5 9.0 - 12.9 fL    Neutrophils-Polys 53.40 44.00 - 72.00 %    Lymphocytes 35.80 22.00 - 41.00 %    Monocytes 8.30 0.00 - 13.40 %    Eosinophils 2.20 0.00 - 6.90 %    Basophils 0.20 0.00 - 1.80 %    Immature Granulocytes 0.10 0.00 - 0.90 %    Nucleated RBC 0.00 /100 WBC    Neutrophils (Absolute) 4.29 2.00 - 7.15 K/uL    Lymphs (Absolute) 2.88 1.00 - 4.80 K/uL    Monos (Absolute) 0.67 0.00 - 0.85 K/uL    Eos (Absolute) 0.18 0.00 - 0.51 K/uL    Baso (Absolute) 0.02 0.00 - 0.12 K/uL    Immature Granulocytes (abs) 0.01 0.00 - 0.11 K/uL    NRBC (Absolute) 0.00 K/uL   Basic Metabolic Panel   Result Value Ref Range    Sodium 137 135 - 145 mmol/L    Potassium 3.8 3.6 - 5.5 mmol/L    Chloride 106 96 - 112 mmol/L    Co2 23 20 - 33 mmol/L    Glucose 126 (H) 65 - 99 mg/dL    Bun 16 8 - 22 mg/dL    Creatinine 1.02 0.50 - 1.40 mg/dL    Calcium 9.1 8.5 - 10.5 mg/dL    Anion Gap 8.0 0.0 - 11.9   BETA-HCG QUANTITATIVE SERUM   Result Value Ref Range    Bhcg <0.6 0.0 - 5.0 mIU/mL   URINALYSIS,CULTURE IF INDICATED   Result Value Ref Range    Color Yellow     Character Clear     Specific Gravity  1.015 <1.035    Ph 7.0 5.0 - 8.0    Glucose Negative Negative mg/dL    Ketones Trace (A) Negative mg/dL    Protein Negative Negative mg/dL    Bilirubin Negative Negative    Urobilinogen, Urine 0.2 Negative    Nitrite Negative Negative    Leukocyte Esterase Negative Negative    Occult Blood Negative Negative    Micro Urine Req see below    ESTIMATED GFR   Result Value Ref Range    GFR If African American >60 >60 mL/min/1.73 m 2    GFR If Non African American >60 >60 mL/min/1.73 m 2   Chlamydia/GC PCR Urine Or Swab   Result Value Ref Range    Source Endo/Cervical        All labs reviewed by me.    RADIOLOGY  US-PELVIC COMPLETE (TRANSABDOMINAL/TRANSVAGINAL) (COMBO)   Final Result      1.  No intrauterine or uterine gestational sac identified.      2.  Echogenic foci within the endometrial complex which may represent calcifications. No endometrial complex thickening or uterine mass.      3.  Normal appearance of each ovary.      4.  No adnexal mass or free fluid.        The radiologist's interpretation of all radiological studies have been reviewed by me.    COURSE & MEDICAL DECISION MAKING  Pertinent Labs & Imaging studies reviewed. (See chart for details)    Obtained and reviewed past medical records.  Patient's last encounter was an ED visit in March 2018 she was diagnosed with torticollis, STD exposure and right foot pain.    5:07 AM - Patient seen and examined at bedside.  This is a 23-year-old female who presents with vaginal bleeding and abdominal pain.  She is 2 months postpartum.  On exam, she has a small amount of bleeding I suspect that its most likely, her.  And they are irregular after delivery however, will obtain an ultrasound in the event that this is a repeat pregnancy or there are other abnormalities of the uterus or pelvis.  Pelvic exam is been done and swabs obtained to assess for infection.  Will check urine analysis.      10 AM, patient's reevaluated at the bedside.  She is resting and appears  comfortable.  She was able to get some rest.  We discussed lab results which overall reveal ketones in her urine, concerning for dehydration especially given her symptoms of lightheadedness recently.  Patient is requesting a work note which is provided.  This time, I feel she can safely be discharged to home.  Ultrasound was unrevealing.  She is not pregnant she is not anemic.  She is advised to follow-up with her private PCP dolores Addison.  She is well-appearing and nontoxic with normal vital signs.  She will be discharged home in stable condition.        FINAL IMPRESSION  1. Vaginal bleeding    2. Dehydration

## 2019-06-12 NOTE — ED NOTES
Pt discharged home as ordered by erp. Pt instructed to follow up with their PCP and return here as needed. pt verbalized understanding and left ambulating independently with family